# Patient Record
Sex: FEMALE | Race: WHITE | NOT HISPANIC OR LATINO | Employment: FULL TIME | URBAN - METROPOLITAN AREA
[De-identification: names, ages, dates, MRNs, and addresses within clinical notes are randomized per-mention and may not be internally consistent; named-entity substitution may affect disease eponyms.]

---

## 2021-11-02 PROBLEM — D75.839 THROMBOCYTOSIS: Status: ACTIVE | Noted: 2021-11-02

## 2021-11-02 PROBLEM — E55.9 VITAMIN D DEFICIENCY: Status: ACTIVE | Noted: 2021-11-02

## 2021-11-02 PROBLEM — G44.209 TENSION HEADACHE: Status: ACTIVE | Noted: 2021-11-02

## 2021-11-02 PROBLEM — E66.01 CLASS 3 SEVERE OBESITY DUE TO EXCESS CALORIES WITHOUT SERIOUS COMORBIDITY WITH BODY MASS INDEX (BMI) OF 45.0 TO 49.9 IN ADULT: Status: ACTIVE | Noted: 2021-11-02

## 2022-06-17 PROBLEM — Q87.89: Status: ACTIVE | Noted: 2022-06-17

## 2023-06-21 PROBLEM — C79.9 METASTATIC CANCER: Status: ACTIVE | Noted: 2023-06-21

## 2023-06-21 PROBLEM — D62 ACUTE BLOOD LOSS ANEMIA: Status: ACTIVE | Noted: 2023-06-21

## 2023-06-21 PROBLEM — K76.89 SUBCAPSULAR HEMATOMA OF LIVER: Status: ACTIVE | Noted: 2023-06-21

## 2023-06-22 PROBLEM — R16.0 LIVER MASS: Status: ACTIVE | Noted: 2023-06-22

## 2023-06-23 ENCOUNTER — TELEPHONE (OUTPATIENT)
Dept: VASCULAR SURGERY | Facility: CLINIC | Age: 33
End: 2023-06-23
Payer: COMMERCIAL

## 2023-06-23 PROBLEM — R93.89 ABNORMAL CT OF THE CHEST: Status: ACTIVE | Noted: 2023-06-23

## 2023-06-23 PROBLEM — R22.2 MASS IN CHEST: Status: ACTIVE | Noted: 2023-06-23

## 2023-06-23 PROBLEM — F41.8 SITUATIONAL ANXIETY: Status: ACTIVE | Noted: 2023-06-23

## 2023-06-23 PROBLEM — R10.11 RUQ PAIN: Status: ACTIVE | Noted: 2023-06-23

## 2023-06-23 NOTE — TELEPHONE ENCOUNTER
----- Message from Alexia Don sent at 6/23/2023 11:36 AM CDT -----  Regarding: advice  Contact: Little  Type: Needs Medical Advice  Who Called:  Little with [facility]   Symptoms (please be specific):    How long has patient had these symptoms:    Pharmacy name and phone #:    Best Call Back Number: [phone]  Additional Information: Little stated that patient is in need of a consult. Please contact to advise. Thanks!

## 2023-07-13 PROBLEM — G89.3 CANCER ASSOCIATED PAIN: Status: ACTIVE | Noted: 2023-07-13

## 2023-07-21 ENCOUNTER — TELEPHONE (OUTPATIENT)
Dept: HEMATOLOGY/ONCOLOGY | Facility: CLINIC | Age: 33
End: 2023-07-21
Payer: COMMERCIAL

## 2023-07-21 ENCOUNTER — PATIENT MESSAGE (OUTPATIENT)
Dept: HEMATOLOGY/ONCOLOGY | Facility: CLINIC | Age: 33
End: 2023-07-21
Payer: COMMERCIAL

## 2023-07-21 NOTE — TELEPHONE ENCOUNTER
I spoke with the patient about getting an IO appt scheduled per referral. I explained in detail what we offer and listed some symptoms/side effects that we can help address using our support services. She stated she has many appts next week and will call back after all of those appts are out of the way. I sent information to [employer] as discussed.

## 2023-08-01 ENCOUNTER — TELEPHONE (OUTPATIENT)
Dept: HEMATOLOGY/ONCOLOGY | Facility: CLINIC | Age: 33
End: 2023-08-01
Payer: COMMERCIAL

## 2023-08-01 NOTE — TELEPHONE ENCOUNTER
Spoke with the patient to schedule IO from referral. She voiced her schedule is still not free to book ppt but she understands to call when ready.

## 2023-08-21 ENCOUNTER — TELEPHONE (OUTPATIENT)
Dept: HEMATOLOGY/ONCOLOGY | Facility: CLINIC | Age: 33
End: 2023-08-21
Payer: COMMERCIAL

## 2023-08-21 NOTE — TELEPHONE ENCOUNTER
Spoke with the patient to schedule IO consult per referral. She voiced she is not ready to schedule at this time. Referral cancelled and can reinstate if interested.

## 2023-11-08 PROBLEM — E66.01 SEVERE OBESITY (BMI >= 40): Status: ACTIVE | Noted: 2023-11-08

## 2023-11-09 PROBLEM — K91.840 HEMORRHAGE FOLLOWING LIVER BIOPSY: Status: ACTIVE | Noted: 2023-11-09

## 2023-11-12 PROBLEM — E80.6 HYPERBILIRUBINEMIA: Status: ACTIVE | Noted: 2023-11-12
